# Patient Record
Sex: FEMALE | Race: WHITE | NOT HISPANIC OR LATINO
[De-identification: names, ages, dates, MRNs, and addresses within clinical notes are randomized per-mention and may not be internally consistent; named-entity substitution may affect disease eponyms.]

---

## 2019-01-25 PROBLEM — Z00.00 ENCOUNTER FOR PREVENTIVE HEALTH EXAMINATION: Status: ACTIVE | Noted: 2019-01-25

## 2019-02-05 ENCOUNTER — APPOINTMENT (OUTPATIENT)
Dept: OTOLARYNGOLOGY | Facility: CLINIC | Age: 29
End: 2019-02-05

## 2022-10-27 ENCOUNTER — TRANSCRIPTION ENCOUNTER (OUTPATIENT)
Age: 32
End: 2022-10-27

## 2022-10-27 VITALS
WEIGHT: 110.89 LBS | RESPIRATION RATE: 16 BRPM | HEART RATE: 76 BPM | HEIGHT: 60 IN | TEMPERATURE: 98 F | SYSTOLIC BLOOD PRESSURE: 99 MMHG | DIASTOLIC BLOOD PRESSURE: 68 MMHG

## 2022-10-28 ENCOUNTER — INPATIENT (INPATIENT)
Facility: HOSPITAL | Age: 32
LOS: 0 days | Discharge: HOME | DRG: 743 | End: 2022-10-29
Attending: OBSTETRICS & GYNECOLOGY | Admitting: OBSTETRICS & GYNECOLOGY
Payer: COMMERCIAL

## 2022-10-28 ENCOUNTER — TRANSCRIPTION ENCOUNTER (OUTPATIENT)
Age: 32
End: 2022-10-28

## 2022-10-28 ENCOUNTER — RESULT REVIEW (OUTPATIENT)
Age: 32
End: 2022-10-28

## 2022-10-28 DIAGNOSIS — E89.0 POSTPROCEDURAL HYPOTHYROIDISM: Chronic | ICD-10-CM

## 2022-10-28 LAB
BLD GP AB SCN SERPL QL: NEGATIVE — SIGNIFICANT CHANGE UP
RH IG SCN BLD-IMP: POSITIVE — SIGNIFICANT CHANGE UP

## 2022-10-28 PROCEDURE — 88305 TISSUE EXAM BY PATHOLOGIST: CPT | Mod: 26

## 2022-10-28 DEVICE — URETERAL CATH OPEN END FLEXI-TIP 5FR .038" X 70CM: Type: IMPLANTABLE DEVICE | Site: LEFT | Status: FUNCTIONAL

## 2022-10-28 RX ORDER — KETOROLAC TROMETHAMINE 30 MG/ML
30 SYRINGE (ML) INJECTION EVERY 6 HOURS
Refills: 0 | Status: DISCONTINUED | OUTPATIENT
Start: 2022-10-28 | End: 2022-10-28

## 2022-10-28 RX ORDER — ACETAMINOPHEN 500 MG
1000 TABLET ORAL EVERY 6 HOURS
Refills: 0 | Status: DISCONTINUED | OUTPATIENT
Start: 2022-10-28 | End: 2022-10-29

## 2022-10-28 RX ORDER — NORETHINDRONE AND ETHINYL ESTRADIOL 0.4-0.035
1 KIT ORAL
Qty: 0 | Refills: 0 | DISCHARGE

## 2022-10-28 RX ORDER — GABAPENTIN 400 MG/1
300 CAPSULE ORAL ONCE
Refills: 0 | Status: DISCONTINUED | OUTPATIENT
Start: 2022-10-28 | End: 2022-10-28

## 2022-10-28 RX ORDER — SIMETHICONE 80 MG/1
80 TABLET, CHEWABLE ORAL EVERY 6 HOURS
Refills: 0 | Status: DISCONTINUED | OUTPATIENT
Start: 2022-10-28 | End: 2022-10-29

## 2022-10-28 RX ORDER — HYDROMORPHONE HYDROCHLORIDE 2 MG/ML
0.5 INJECTION INTRAMUSCULAR; INTRAVENOUS; SUBCUTANEOUS EVERY 4 HOURS
Refills: 0 | Status: DISCONTINUED | OUTPATIENT
Start: 2022-10-28 | End: 2022-10-29

## 2022-10-28 RX ORDER — LEVOTHYROXINE SODIUM 125 MCG
100 TABLET ORAL EVERY 24 HOURS
Refills: 0 | Status: DISCONTINUED | OUTPATIENT
Start: 2022-10-28 | End: 2022-10-29

## 2022-10-28 RX ORDER — ACETAMINOPHEN 500 MG
1000 TABLET ORAL ONCE
Refills: 0 | Status: DISCONTINUED | OUTPATIENT
Start: 2022-10-28 | End: 2022-10-28

## 2022-10-28 RX ORDER — KETOROLAC TROMETHAMINE 30 MG/ML
30 SYRINGE (ML) INJECTION EVERY 6 HOURS
Refills: 0 | Status: DISCONTINUED | OUTPATIENT
Start: 2022-10-28 | End: 2022-10-29

## 2022-10-28 RX ORDER — LEVOTHYROXINE SODIUM 125 MCG
1 TABLET ORAL
Qty: 0 | Refills: 0 | DISCHARGE

## 2022-10-28 RX ORDER — METOCLOPRAMIDE HCL 10 MG
10 TABLET ORAL EVERY 8 HOURS
Refills: 0 | Status: DISCONTINUED | OUTPATIENT
Start: 2022-10-28 | End: 2022-10-29

## 2022-10-28 RX ORDER — HYDROMORPHONE HYDROCHLORIDE 2 MG/ML
0.5 INJECTION INTRAMUSCULAR; INTRAVENOUS; SUBCUTANEOUS
Refills: 0 | Status: DISCONTINUED | OUTPATIENT
Start: 2022-10-28 | End: 2022-10-29

## 2022-10-28 RX ORDER — ONDANSETRON 8 MG/1
8 TABLET, FILM COATED ORAL EVERY 8 HOURS
Refills: 0 | Status: DISCONTINUED | OUTPATIENT
Start: 2022-10-28 | End: 2022-10-29

## 2022-10-28 RX ORDER — SODIUM CHLORIDE 9 MG/ML
1000 INJECTION, SOLUTION INTRAVENOUS
Refills: 0 | Status: DISCONTINUED | OUTPATIENT
Start: 2022-10-28 | End: 2022-10-29

## 2022-10-28 RX ADMIN — HYDROMORPHONE HYDROCHLORIDE 0.5 MILLIGRAM(S): 2 INJECTION INTRAMUSCULAR; INTRAVENOUS; SUBCUTANEOUS at 20:40

## 2022-10-28 RX ADMIN — Medication 30 MILLIGRAM(S): at 23:49

## 2022-10-28 RX ADMIN — SODIUM CHLORIDE 125 MILLILITER(S): 9 INJECTION, SOLUTION INTRAVENOUS at 20:21

## 2022-10-28 RX ADMIN — SIMETHICONE 80 MILLIGRAM(S): 80 TABLET, CHEWABLE ORAL at 20:50

## 2022-10-28 RX ADMIN — Medication 1000 MILLIGRAM(S): at 23:00

## 2022-10-28 RX ADMIN — HYDROMORPHONE HYDROCHLORIDE 0.5 MILLIGRAM(S): 2 INJECTION INTRAMUSCULAR; INTRAVENOUS; SUBCUTANEOUS at 20:21

## 2022-10-28 RX ADMIN — HYDROMORPHONE HYDROCHLORIDE 0.5 MILLIGRAM(S): 2 INJECTION INTRAMUSCULAR; INTRAVENOUS; SUBCUTANEOUS at 21:28

## 2022-10-28 RX ADMIN — Medication 1000 MILLIGRAM(S): at 22:49

## 2022-10-28 NOTE — CHART NOTE - NSCHARTNOTEFT_GEN_A_CORE
GYN POC    Pt seen and examined at bedside. Pt complains of mild abdominal discomfort, overall controlled on current pain regimen. She is tolerating sips of liquid. Not yet OOB, passing flatus, or voiding. Ayon catheter in place.  Pt denies any fever, chills, chest pain, SOB, nausea or vomiting     T(F): 97.8 (10-28-22 @ 22:03), Max: 97.8 (10-28-22 @ 20:00)  HR: 70 (10-28-22 @ 22:03) (64 - 96)  BP: 102/68 (10-28-22 @ 22:03) (99/68 - 124/79)  RR: 18 (10-28-22 @ 22:03) (16 - 29)  SpO2: 100% (10-28-22 @ 22:03) (97% - 100%)  Wt(kg): --    10-28 @ 07:01  -  10-28 @ 23:48  --------------------------------------------------------  IN: 90 mL / OUT: 250 mL / NET: -160 mL        acetaminophen     Tablet .. 1000 milliGRAM(s) Oral every 6 hours  HYDROmorphone  Injectable 0.5 milliGRAM(s) IV Push every 15 minutes PRN Severe Pain (7 - 10)  HYDROmorphone  Injectable 0.5 milliGRAM(s) IV Push every 4 hours PRN Severe Pain (7 - 10)  ketorolac   Injectable 30 milliGRAM(s) IV Push every 6 hours  lactated ringers. 1000 milliLiter(s) IV Continuous <Continuous>  levothyroxine 100 MICROGram(s) Oral every 24 hours  metoclopramide Injectable 10 milliGRAM(s) IV Push every 8 hours PRN second line nausea/vomiting  ondansetron Injectable 8 milliGRAM(s) IV Push every 8 hours PRN Nausea and/or Vomiting  simethicone 80 milliGRAM(s) Chew every 6 hours      Physical exam:  Constitutional: NAD  Pulmonary: clear to auscultation bilaterally  Abdomen: incision sites clean, dry and intact. B/l ovarian suspensions in place. Soft, mildly tender, nondistended  : ayon draining light blur urine 2/2 ICG dye  Extremities: no lower extremity edema, or calf tenderness. SCDs in place    A/P: 31yo s/p hysteroscopy D&C, l/s endo excision, left ovarian endometrioma exicison, restoration of cul-de-sac, b/l ureterolysis, b/l ovarian suspension with round ligament plication    Neuro: tylenol/toradol, dilaudid 0.5 q4 PRN  CV: VSS  Pulm: RA  FEN/GI: LRD, LR @90, -/-; zofran/reglan PRN, simethicone  : ayon   GYN: b/l ovarian suspensions   Heme: AM CBC  Endo: hx papillary thyroid cancer s/p total thyroidectomy, synthroid 100mcg  DVT ppx: SCDs    [] AM CBC 10/29 (ordered)  [] Ayon and suspensions out at noon

## 2022-10-28 NOTE — PRE-ANESTHESIA EVALUATION ADULT - NSANTHOSAYNRD_GEN_A_CORE
No. ORACIO screening performed.  STOP BANG Legend: 0-2 = LOW Risk; 3-4 = INTERMEDIATE Risk; 5-8 = HIGH Risk

## 2022-10-28 NOTE — BRIEF OPERATIVE NOTE - NSICDXBRIEFPROCEDURE_GEN_ALL_CORE_FT
PROCEDURES:  Excision, endometriosis, laparoscopic 28-Oct-2022 19:49:46  Mini Fields  Laparoscopic ovarian cystectomy 28-Oct-2022 19:49:55  Mini Fields

## 2022-10-28 NOTE — BRIEF OPERATIVE NOTE - OPERATION/FINDINGS
Hysteroscopy D&C performed - retroverted arcuate uterus. Abdominal entry with veress needle. 10mm port inserted. B/l 5mm lateral ports inserted as well as suprapubic port. Left ovarian endometrioma 2cm ruptured for dark brown thick fluid. Cyst wall removed, ovary sutured with 2-0 vicryl. Rectum mobilized off posterior uterus and cul-de-sac restored. Fibrosis removed from posterior cervix and b/l periureter spaces. Anterior broad ligament lesions removed. B/l round ligament plication performed. B/l ovaries suspended.

## 2022-10-28 NOTE — H&P ADULT - HISTORY OF PRESENT ILLNESS
33yo G0 presents for l/s endometriosis excision for menorrhagia, dysmenorrhea, and bloating. LMP ended yesterday    OB Hx: G0  PMH: papillary thyroid carcinoma 2018  Meds: synthroid 100mcg  PSH: total thyroidectomy  NKDA

## 2022-10-29 ENCOUNTER — TRANSCRIPTION ENCOUNTER (OUTPATIENT)
Age: 32
End: 2022-10-29

## 2022-10-29 VITALS — DIASTOLIC BLOOD PRESSURE: 59 MMHG | SYSTOLIC BLOOD PRESSURE: 91 MMHG | HEART RATE: 84 BPM

## 2022-10-29 LAB
BASOPHILS # BLD AUTO: 0.02 K/UL — SIGNIFICANT CHANGE UP (ref 0–0.2)
BASOPHILS NFR BLD AUTO: 0.2 % — SIGNIFICANT CHANGE UP (ref 0–2)
EOSINOPHIL # BLD AUTO: 0 K/UL — SIGNIFICANT CHANGE UP (ref 0–0.5)
EOSINOPHIL NFR BLD AUTO: 0 % — SIGNIFICANT CHANGE UP (ref 0–6)
HCT VFR BLD CALC: 34.1 % — LOW (ref 34.5–45)
HGB BLD-MCNC: 11.3 G/DL — LOW (ref 11.5–15.5)
IMM GRANULOCYTES NFR BLD AUTO: 0.3 % — SIGNIFICANT CHANGE UP (ref 0–0.9)
LYMPHOCYTES # BLD AUTO: 1.2 K/UL — SIGNIFICANT CHANGE UP (ref 1–3.3)
LYMPHOCYTES # BLD AUTO: 12.9 % — LOW (ref 13–44)
MCHC RBC-ENTMCNC: 30.5 PG — SIGNIFICANT CHANGE UP (ref 27–34)
MCHC RBC-ENTMCNC: 33.1 GM/DL — SIGNIFICANT CHANGE UP (ref 32–36)
MCV RBC AUTO: 92.2 FL — SIGNIFICANT CHANGE UP (ref 80–100)
MONOCYTES # BLD AUTO: 0.7 K/UL — SIGNIFICANT CHANGE UP (ref 0–0.9)
MONOCYTES NFR BLD AUTO: 7.5 % — SIGNIFICANT CHANGE UP (ref 2–14)
NEUTROPHILS # BLD AUTO: 7.33 K/UL — SIGNIFICANT CHANGE UP (ref 1.8–7.4)
NEUTROPHILS NFR BLD AUTO: 79.1 % — HIGH (ref 43–77)
NRBC # BLD: 0 /100 WBCS — SIGNIFICANT CHANGE UP (ref 0–0)
PLATELET # BLD AUTO: 241 K/UL — SIGNIFICANT CHANGE UP (ref 150–400)
RBC # BLD: 3.7 M/UL — LOW (ref 3.8–5.2)
RBC # FLD: 13.1 % — SIGNIFICANT CHANGE UP (ref 10.3–14.5)
WBC # BLD: 9.28 K/UL — SIGNIFICANT CHANGE UP (ref 3.8–10.5)
WBC # FLD AUTO: 9.28 K/UL — SIGNIFICANT CHANGE UP (ref 3.8–10.5)

## 2022-10-29 PROCEDURE — 86850 RBC ANTIBODY SCREEN: CPT

## 2022-10-29 PROCEDURE — 88305 TISSUE EXAM BY PATHOLOGIST: CPT

## 2022-10-29 PROCEDURE — 86900 BLOOD TYPING SEROLOGIC ABO: CPT

## 2022-10-29 PROCEDURE — 86901 BLOOD TYPING SEROLOGIC RH(D): CPT

## 2022-10-29 PROCEDURE — 85025 COMPLETE CBC W/AUTO DIFF WBC: CPT

## 2022-10-29 PROCEDURE — 36415 COLL VENOUS BLD VENIPUNCTURE: CPT

## 2022-10-29 RX ORDER — KETOROLAC TROMETHAMINE 30 MG/ML
1 SYRINGE (ML) INJECTION
Qty: 20 | Refills: 0
Start: 2022-10-29 | End: 2022-11-02

## 2022-10-29 RX ORDER — ACETAMINOPHEN 500 MG
2 TABLET ORAL
Qty: 0 | Refills: 0 | DISCHARGE
Start: 2022-10-29

## 2022-10-29 RX ADMIN — Medication 1000 MILLIGRAM(S): at 11:00

## 2022-10-29 RX ADMIN — Medication 30 MILLIGRAM(S): at 13:33

## 2022-10-29 RX ADMIN — Medication 1000 MILLIGRAM(S): at 10:34

## 2022-10-29 RX ADMIN — Medication 30 MILLIGRAM(S): at 06:35

## 2022-10-29 RX ADMIN — SODIUM CHLORIDE 90 MILLILITER(S): 9 INJECTION, SOLUTION INTRAVENOUS at 06:21

## 2022-10-29 RX ADMIN — SIMETHICONE 80 MILLIGRAM(S): 80 TABLET, CHEWABLE ORAL at 04:21

## 2022-10-29 RX ADMIN — Medication 30 MILLIGRAM(S): at 14:30

## 2022-10-29 RX ADMIN — SIMETHICONE 80 MILLIGRAM(S): 80 TABLET, CHEWABLE ORAL at 16:31

## 2022-10-29 RX ADMIN — Medication 1000 MILLIGRAM(S): at 16:30

## 2022-10-29 RX ADMIN — Medication 100 MICROGRAM(S): at 06:20

## 2022-10-29 RX ADMIN — Medication 30 MILLIGRAM(S): at 00:05

## 2022-10-29 RX ADMIN — SIMETHICONE 80 MILLIGRAM(S): 80 TABLET, CHEWABLE ORAL at 10:35

## 2022-10-29 RX ADMIN — Medication 1000 MILLIGRAM(S): at 05:12

## 2022-10-29 RX ADMIN — Medication 30 MILLIGRAM(S): at 06:20

## 2022-10-29 RX ADMIN — Medication 1000 MILLIGRAM(S): at 04:20

## 2022-10-29 RX ADMIN — Medication 1000 MILLIGRAM(S): at 18:16

## 2022-10-29 NOTE — DISCHARGE NOTE PROVIDER - NSDCMRMEDTOKEN_GEN_ALL_CORE_FT
acetaminophen 500 mg oral tablet: 2 tab(s) orally every 6 hours  Junel 1.5/30 oral tablet: 1 tab(s) orally once a day  Synthroid 100 mcg (0.1 mg) oral tablet: 1 tab(s) orally once a day   acetaminophen 500 mg oral tablet: 2 tab(s) orally every 6 hours  Junel 1.5/30 oral tablet: 1 tab(s) orally once a day  ketorolac 10 mg oral tablet: 1 tab(s) orally every 6 hours   Synthroid 100 mcg (0.1 mg) oral tablet: 1 tab(s) orally once a day

## 2022-10-29 NOTE — PROGRESS NOTE ADULT - SUBJECTIVE AND OBJECTIVE BOX
Pt seen and examined at bedside. Pt states mild abdominal pain. Pt non ambulating, tolerating diet, - flatus, Joseph in place with clear urine  Pt denies fever, chills, chest pain, SOB, nausea, vomiting, lightheadedness, dizziness.      T(F): 98.8 (10-29-22 @ 04:43), Max: 98.8 (10-29-22 @ 04:43)  HR: 87 (10-29-22 @ 04:43) (64 - 96)  BP: 101/62 (10-29-22 @ 04:43) (99/68 - 124/79)  RR: 17 (10-29-22 @ 04:43) (16 - 29)  SpO2: 100% (10-29-22 @ 04:43) (97% - 100%)  Wt(kg): --  I&O's Summary    28 Oct 2022 07:01  -  29 Oct 2022 07:00  --------------------------------------------------------  IN: 720 mL / OUT: 1200 mL / NET: -480 mL        MEDICATIONS  (STANDING):  acetaminophen     Tablet .. 1000 milliGRAM(s) Oral every 6 hours  ketorolac   Injectable 30 milliGRAM(s) IV Push every 6 hours  lactated ringers. 1000 milliLiter(s) (90 mL/Hr) IV Continuous <Continuous>  levothyroxine 100 MICROGram(s) Oral every 24 hours  simethicone 80 milliGRAM(s) Chew every 6 hours    MEDICATIONS  (PRN):  HYDROmorphone  Injectable 0.5 milliGRAM(s) IV Push every 15 minutes PRN Severe Pain (7 - 10)  HYDROmorphone  Injectable 0.5 milliGRAM(s) IV Push every 4 hours PRN Severe Pain (7 - 10)  metoclopramide Injectable 10 milliGRAM(s) IV Push every 8 hours PRN second line nausea/vomiting  ondansetron Injectable 8 milliGRAM(s) IV Push every 8 hours PRN Nausea and/or Vomiting      Physical Exam:  Constitutional: NAD  Pulmonary: clear to auscultation bilaterally   Cardiovascular: Regular rate and rhythm   Abdomen: incision site clean, dry, intact. Soft, mildly tender, [] distended, no guarding, no rebound, [] bowel sounds  Extremities: no lower extremity edema or calve tenderness. SCDs in place     LABS:                        11.3   9.28  )-----------( 241      ( 29 Oct 2022 06:17 )             34.1                 RADIOLOGY & ADDITIONAL TESTS:     BMI: BMI (kg/m2): 31.6 (11-29-21 @ 16:35)  HbA1c: A1C with Estimated Average Glucose Result: 5.4 % (11-04-21 @ 06:58)    Glucose: POCT Blood Glucose.: 78 mg/dL (12-30-21 @ 07:34)    BP: 94/58 (04-02-22 @ 12:20) (94/58 - 94/58)  Lipid Panel:

## 2022-10-29 NOTE — PROGRESS NOTE ADULT - ASSESSMENT
33yo POD1 s/p hysteroscopy D&C, l/s endo excision, left ovarian endometrioma excision, restoration of cul-de-sac, b/l ureterolysis, b/l ovarian suspension with round ligament plication    Neuro: tylenol/toradol, dilaudid 0.5 q4 PRN  CV: VSS  Pulm: RA  FEN/GI: LRD, heplocked, -/-; zofran/reglan PRN, simethicone  : ayon   GYN: b/l ovarian suspensionsremove at 12:00 10/29  Heme: AM H 11.3  Endo: hx papillary thyroid cancer s/p total thyroidectomy, synthroid 100mcg  DVT ppx: SCDs

## 2022-10-29 NOTE — DISCHARGE NOTE PROVIDER - NSDCFUADDINST_GEN_ALL_CORE_FT
- Nothing in vagina - no intercourse, tampons, or douching until cleared by your doctor.   - Avoid swimming, tub baths, strenuous activity and heavy lifting until cleared by your doctor.   - Showering is ok.   - Continue oral pain medications prescribed by your doctor as needed for pain.    - Follow up in office in 1-2 weeks for your postoperative visit.  Call the office to confirm your follow up appointment.   - Call the office sooner if you develop severe pain, heavy vaginal bleeding greater than 2 pads in 2 hours, or fevers greater than 100.4 F. Go to the closest emergency room for any of these symptoms if you are not able to contact your doctor.

## 2022-10-29 NOTE — DISCHARGE NOTE PROVIDER - HOSPITAL COURSE
31yo s/p scheduled hysteroscopy D&C, l/s endo excision, left ovarian endometrioma exicison, restoration of cul-de-sac, b/l ureterolysis, b/l ovarian suspension with round ligament plication. B/l ovarian suspensions were removed on POD1. Postoperative course was uncomplicated and at the time of discharge pt is ambulating independently, tolerating PO, passing flatus, and voiding. Pt is clinically and hemodynamically stable for discharge at this time.

## 2022-10-29 NOTE — DISCHARGE NOTE NURSING/CASE MANAGEMENT/SOCIAL WORK - NSDCPEFALRISK_GEN_ALL_CORE
For information on Fall & Injury Prevention, visit: [email or web address] OR  [email or web address] OR  https://www.cdc.gov/steadi/patient.html

## 2022-10-29 NOTE — DISCHARGE NOTE PROVIDER - CARE PROVIDER_API CALL
Junior Malagon)  Obstetrics and Gynecology  [address] [id number]  Phone: [phone]  Fax: [phone]  Established Patient  Follow Up Time:

## 2022-10-29 NOTE — DISCHARGE NOTE NURSING/CASE MANAGEMENT/SOCIAL WORK - PATIENT PORTAL LINK FT
You can access the FollowMyHealth Patient Portal offered by [facility] by registering at the following website: [email or web address]. By joining [facility]’s FollowMyHealth portal, you will also be able to view your health information using other applications (apps) compatible with our system.

## 2022-11-04 DIAGNOSIS — D25.9 LEIOMYOMA OF UTERUS, UNSPECIFIED: ICD-10-CM

## 2022-11-04 DIAGNOSIS — Z85.850 PERSONAL HISTORY OF MALIGNANT NEOPLASM OF THYROID: ICD-10-CM

## 2022-11-04 DIAGNOSIS — N80.103 ENDOMETRIOSIS OF BILATERAL OVARIES, UNSPECIFIED DEPTH: ICD-10-CM

## 2022-11-04 DIAGNOSIS — N80.A61: ICD-10-CM

## 2022-11-04 DIAGNOSIS — N83.202 UNSPECIFIED OVARIAN CYST, LEFT SIDE: ICD-10-CM

## 2022-11-04 DIAGNOSIS — N80.A0 ENDOMETRIOSIS OF BLADDER, UNSPECIFIED DEPTH: ICD-10-CM

## 2022-11-04 DIAGNOSIS — N80.519 ENDOMETRIOSIS OF THE RECTUM, UNSPECIFIED DEPTH: ICD-10-CM

## 2022-11-04 DIAGNOSIS — E89.0 POSTPROCEDURAL HYPOTHYROIDISM: ICD-10-CM

## 2022-11-04 DIAGNOSIS — F10.90 ALCOHOL USE, UNSPECIFIED, UNCOMPLICATED: ICD-10-CM

## 2022-11-04 DIAGNOSIS — N80.3B2: ICD-10-CM

## 2022-11-10 LAB — SURGICAL PATHOLOGY STUDY: SIGNIFICANT CHANGE UP

## (undated) DEVICE — PACK GENERAL CLOSING

## (undated) DEVICE — TUBING TUR 2 PRONG

## (undated) DEVICE — BLADE SURGICAL #10 CARBON

## (undated) DEVICE — SUT VICRYL PLUS 0 36" CT-1

## (undated) DEVICE — DRAPE SURGICAL #1010

## (undated) DEVICE — DRAINAGE BAG URINARY 4000CC

## (undated) DEVICE — SUT MAXON 0 30" HGU-46

## (undated) DEVICE — GLV 6.5 PROTEXIS (WHITE)

## (undated) DEVICE — SUT SILK 0 18" TIES

## (undated) DEVICE — DRSG GAUZE VASELINE PETROLEUM 3 X 9"

## (undated) DEVICE — PREP CHLORAPREP HI-LITE ORANGE 26ML

## (undated) DEVICE — ENDOCATCH 10MM SPECIMEN POUCH

## (undated) DEVICE — NDL GRASPING DISP

## (undated) DEVICE — SUT MONOCRYL 2-0 27" SH VIOLET

## (undated) DEVICE — RAYTEC SPONGE 4X4 16PLY

## (undated) DEVICE — DRSG STERISTRIPS 0.5 X 4"

## (undated) DEVICE — WARMING BLANKET UPPER ADULT

## (undated) DEVICE — TUBING STRYKER PNEUMOCLEAR SMOKE EVACUATION HIGH FLOW

## (undated) DEVICE — FOLEY TRAY 16FR 5CC LF UMETER CLOSED

## (undated) DEVICE — DRAPE STERI-DRAPE INCISE 32X33"

## (undated) DEVICE — SUT MONOCRYL 4-0 27" PS-2 UNDYED

## (undated) DEVICE — SOL IRR BAG NS 0.9% 3000ML

## (undated) DEVICE — ELCTR CUTTING LOOP FOR PRINCESS RESECTOSCOPE 21FR

## (undated) DEVICE — ELCTR THUNDERBEAT HANDPIECE 5MM X 35CM FRONT CONTROL

## (undated) DEVICE — SUT ETHIBOND 0 30" CT-2

## (undated) DEVICE — POSITIONER PINK PAD PIGAZZI SYSTEM XL W ARM PROTECTOR

## (undated) DEVICE — SUT VICRYL 1 18" CT-1 UNDYED (POP-OFF)

## (undated) DEVICE — INSUFFLATION NDL COVIDIEN SURGINEEDLE VERESS 120MM

## (undated) DEVICE — FOLEY TRAY 14FR 5CC LF UMETER CLOSED

## (undated) DEVICE — TROCAR ETHICON ENDOPATH XCEL BLADELESS 11MM X 100MM STABILITY

## (undated) DEVICE — DRAPE 3/4 SHEET 52X76"

## (undated) DEVICE — TUBING RANGER FLUID IRRIGATION SET DISP

## (undated) DEVICE — GLV 8 PROTEXIS (WHITE)

## (undated) DEVICE — VENODYNE/SCD SLEEVE CALF MEDIUM

## (undated) DEVICE — TROCAR COOPER SURGICAL PYRAMIDAL TIP 5X10.16

## (undated) DEVICE — ENDOCATCH II 15MM

## (undated) DEVICE — SHEARS HARMONIC ACE 5MM X 36CM CURVED TIP

## (undated) DEVICE — LIGASURE MARYLAND 37CM

## (undated) DEVICE — POSITIONER FOAM EGG CRATE ULNAR 2PCS (PINK)

## (undated) DEVICE — TROCAR ETHICON ENDOPATH MINI BLADELESS 2/3MM X 100MM SMOOTH

## (undated) DEVICE — PACK GYN WDC

## (undated) DEVICE — SUT VICRYL 0 27" UR-6

## (undated) DEVICE — CATH ANGIOCATH 12G

## (undated) DEVICE — TUBING STRYKER ARTHROSCOPY INFLOW

## (undated) DEVICE — TROCAR COOPER SURGICAL LAPAROSCOPIC CANNULA 5MM

## (undated) DEVICE — PRESSURE INFUSOR BAG 3000ML

## (undated) DEVICE — ENDO LOOP LINA GOLD LOOP FOR LSH 200MM X 100MM LG CONNECTOR

## (undated) DEVICE — PACK D&C LNX SURGICOUNT

## (undated) DEVICE — SUT VICRYL 2-0 27" SH

## (undated) DEVICE — BLADE SURGICAL #12 CARBON STEEL

## (undated) DEVICE — PACK DEL VAG LNX SURGICOUNT

## (undated) DEVICE — TUBING AQUILEX OUTFLOW

## (undated) DEVICE — DRAPE LIGHT HANDLE COVER (BLUE)

## (undated) DEVICE — D HELP - CLEARVIEW CLEARIFY SYSTEM

## (undated) DEVICE — ENDOPATH 5MM CVD SCISSOR W MONOPOLAR CAUTERY DISP

## (undated) DEVICE — SUT PDS II 0 27" CT-1

## (undated) DEVICE — DRAPE TOWEL BLUE 17" X 24"

## (undated) DEVICE — Device